# Patient Record
Sex: MALE | Race: OTHER | HISPANIC OR LATINO | Employment: UNEMPLOYED | URBAN - NONMETROPOLITAN AREA
[De-identification: names, ages, dates, MRNs, and addresses within clinical notes are randomized per-mention and may not be internally consistent; named-entity substitution may affect disease eponyms.]

---

## 2024-04-15 ENCOUNTER — HOSPITAL ENCOUNTER (EMERGENCY)
Facility: HOSPITAL | Age: 24
Discharge: HOME | End: 2024-04-15
Attending: EMERGENCY MEDICINE

## 2024-04-15 VITALS
SYSTOLIC BLOOD PRESSURE: 135 MMHG | WEIGHT: 139 LBS | DIASTOLIC BLOOD PRESSURE: 73 MMHG | TEMPERATURE: 99 F | RESPIRATION RATE: 18 BRPM | OXYGEN SATURATION: 99 % | HEART RATE: 71 BPM

## 2024-04-15 DIAGNOSIS — M54.50 ACUTE LOW BACK PAIN WITHOUT SCIATICA, UNSPECIFIED BACK PAIN LATERALITY: Primary | ICD-10-CM

## 2024-04-15 PROCEDURE — 96372 THER/PROPH/DIAG INJ SC/IM: CPT | Performed by: NURSE PRACTITIONER

## 2024-04-15 PROCEDURE — 99284 EMERGENCY DEPT VISIT MOD MDM: CPT | Mod: 25

## 2024-04-15 PROCEDURE — 63600175 PHARM REV CODE 636 W HCPCS: Performed by: NURSE PRACTITIONER

## 2024-04-15 RX ORDER — KETOROLAC TROMETHAMINE 30 MG/ML
60 INJECTION, SOLUTION INTRAMUSCULAR; INTRAVENOUS
Status: COMPLETED | OUTPATIENT
Start: 2024-04-15 | End: 2024-04-15

## 2024-04-15 RX ORDER — METHOCARBAMOL 500 MG/1
1000 TABLET, FILM COATED ORAL 2 TIMES DAILY PRN
Qty: 20 TABLET | Refills: 0 | Status: SHIPPED | OUTPATIENT
Start: 2024-04-15 | End: 2024-04-20

## 2024-04-15 RX ORDER — DICLOFENAC SODIUM 75 MG/1
75 TABLET, DELAYED RELEASE ORAL 2 TIMES DAILY
Qty: 10 TABLET | Refills: 0 | Status: SHIPPED | OUTPATIENT
Start: 2024-04-15 | End: 2024-04-20

## 2024-04-15 RX ADMIN — KETOROLAC TROMETHAMINE 60 MG: 30 INJECTION, SOLUTION INTRAMUSCULAR at 04:04

## 2024-04-15 NOTE — DISCHARGE INSTRUCTIONS
Take medications as directed.  Plan to follow-up with primary care in 1 week if your symptoms persist.  Avoid heavy lifting and prolonged inactivity.  Return to the emergency department for worsening condition.

## 2024-04-15 NOTE — Clinical Note
"Bharath "Bharathdamien Guajardo was seen and treated in our emergency department on 4/15/2024.  He may return to work on 04/17/2024.       If you have any questions or concerns, please don't hesitate to call.      Olya Lopez, NP"

## 2024-04-15 NOTE — ED PROVIDER NOTES
Encounter Date: 4/15/2024       History     Chief Complaint   Patient presents with    Back Pain     Pt stated that he has been experiencing lower back pain for the past 3 days. Stated that he was picking up laundry at onset and worsened as time passed.      This is a 23-year-old  male with no reported past medical history who presents to the emergency department with complaints of lower back pain for 3 days.  He reports relatively constant midline aching back pain that began while folding laundry.  He states that over the last few days pain has become progressively worse, exacerbated by movement and improved with rest.  He denies radiation of pain into lower extremities, bladder/bowel dysfunction, or numbness/tingling.  He admits to experiencing similar symptoms in the past with lesser severity.  He reports taking medication purchased over-the-counter but can not recall the name medicine.      Review of patient's allergies indicates:  No Known Allergies  No past medical history on file.  No past surgical history on file.  No family history on file.     Review of Systems   Constitutional:  Negative for appetite change and fever.   Gastrointestinal:  Negative for abdominal pain, diarrhea and vomiting.   Genitourinary:  Negative for difficulty urinating.   Musculoskeletal:  Positive for back pain.   Neurological:  Negative for numbness.       Physical Exam     Initial Vitals   BP Pulse Resp Temp SpO2   04/15/24 1543 04/15/24 1542 04/15/24 1542 04/15/24 1543 04/15/24 1542   135/73 71 18 98.6 °F (37 °C) 99 %      MAP       --                Physical Exam    Nursing note and vitals reviewed.  Constitutional: He appears well-developed and well-nourished. He is active. No distress.   HENT:   Head: Normocephalic and atraumatic.   Eyes: EOM are normal. Pupils are equal, round, and reactive to light.   Neck: Neck supple.   Normal range of motion.  Cardiovascular:  Normal rate, regular rhythm and normal heart sounds.            Pulmonary/Chest: Breath sounds normal. No respiratory distress.   Musculoskeletal:         General: Tenderness present. Normal range of motion.      Cervical back: Normal range of motion and neck supple.      Comments: The low back appears normal upon inspection, no rash or discoloration noted.  Patient does experience generalized tenderness over the midline and paraspinal lumbar regions.  Muscle strength to lower extremities is 5/5.     Neurological: He is alert and oriented to person, place, and time. GCS score is 15. GCS eye subscore is 4. GCS verbal subscore is 5. GCS motor subscore is 6.   Skin: Skin is warm and dry. Capillary refill takes less than 2 seconds.   Psychiatric: He has a normal mood and affect. His behavior is normal. Thought content normal.         ED Course   Procedures  Labs Reviewed - No data to display       Imaging Results    None          Medications   ketorolac injection 60 mg (has no administration in time range)     Medical Decision Making  Risk  Prescription drug management.                                      Clinical Impression:  Final diagnoses:  [M54.50] Acute low back pain without sciatica, unspecified back pain laterality (Primary)          ED Disposition Condition    Discharge Stable          ED Prescriptions       Medication Sig Dispense Start Date End Date Auth. Provider    diclofenac (VOLTAREN) 75 MG EC tablet Take 1 tablet (75 mg total) by mouth 2 (two) times daily. for 5 days 10 tablet 4/15/2024 4/20/2024 Olya Lopez NP    methocarbamoL (ROBAXIN) 500 MG Tab Take 2 tablets (1,000 mg total) by mouth 2 (two) times daily as needed. 20 tablet 4/15/2024 4/20/2024 Olya Lopez NP          Follow-up Information       Follow up With Specialties Details Why Contact Info    Debi Reyes,  Family Medicine Schedule an appointment as soon as possible for a visit in 1 week for re-evaluation of today's complaint [address]  [address]  [facility]  [id number]  [phone]               Olya Lopez, NP  04/15/24 1689